# Patient Record
Sex: FEMALE | Race: WHITE | NOT HISPANIC OR LATINO | Employment: FULL TIME | URBAN - NONMETROPOLITAN AREA
[De-identification: names, ages, dates, MRNs, and addresses within clinical notes are randomized per-mention and may not be internally consistent; named-entity substitution may affect disease eponyms.]

---

## 2024-01-15 ENCOUNTER — APPOINTMENT (OUTPATIENT)
Dept: OCCUPATIONAL MEDICINE | Facility: OTHER | Age: 39
End: 2024-01-15

## 2024-01-15 PROCEDURE — 99000 SPECIMEN HANDLING OFFICE-LAB: CPT

## 2024-03-27 ENCOUNTER — APPOINTMENT (OUTPATIENT)
Dept: OCCUPATIONAL MEDICINE | Facility: OTHER | Age: 39
End: 2024-03-27

## 2024-03-27 ENCOUNTER — APPOINTMENT (OUTPATIENT)
Dept: GENERAL RADIOLOGY | Facility: HOSPITAL | Age: 39
End: 2024-03-27
Attending: PHYSICIAN ASSISTANT
Payer: OTHER MISCELLANEOUS

## 2024-03-27 ENCOUNTER — HOSPITAL ENCOUNTER (EMERGENCY)
Facility: HOSPITAL | Age: 39
Discharge: HOME | End: 2024-03-27
Attending: PHYSICIAN ASSISTANT | Admitting: PHYSICIAN ASSISTANT
Payer: OTHER MISCELLANEOUS

## 2024-03-27 VITALS
SYSTOLIC BLOOD PRESSURE: 142 MMHG | HEART RATE: 54 BPM | TEMPERATURE: 97.7 F | DIASTOLIC BLOOD PRESSURE: 87 MMHG | RESPIRATION RATE: 18 BRPM | OXYGEN SATURATION: 99 %

## 2024-03-27 DIAGNOSIS — S69.91XA FINGER INJURY, RIGHT, INITIAL ENCOUNTER: ICD-10-CM

## 2024-03-27 PROCEDURE — 12042 INTMD RPR N-HF/GENIT2.6-7.5: CPT

## 2024-03-27 PROCEDURE — 99283 EMERGENCY DEPT VISIT LOW MDM: CPT | Mod: 25 | Performed by: PHYSICIAN ASSISTANT

## 2024-03-27 PROCEDURE — 250N000009 HC RX 250: Performed by: PHYSICIAN ASSISTANT

## 2024-03-27 PROCEDURE — 99284 EMERGENCY DEPT VISIT MOD MDM: CPT | Mod: 25

## 2024-03-27 PROCEDURE — 82075 ASSAY OF BREATH ETHANOL: CPT

## 2024-03-27 PROCEDURE — 12042 INTMD RPR N-HF/GENIT2.6-7.5: CPT | Performed by: PHYSICIAN ASSISTANT

## 2024-03-27 PROCEDURE — 73140 X-RAY EXAM OF FINGER(S): CPT | Mod: RT

## 2024-03-27 PROCEDURE — 99000 SPECIMEN HANDLING OFFICE-LAB: CPT

## 2024-03-27 RX ORDER — CEPHALEXIN 500 MG/1
500 CAPSULE ORAL 3 TIMES DAILY
Qty: 15 CAPSULE | Refills: 0 | Status: SHIPPED | OUTPATIENT
Start: 2024-03-27 | End: 2024-04-01

## 2024-03-27 RX ORDER — LIDOCAINE HYDROCHLORIDE 20 MG/ML
40 INJECTION, SOLUTION INFILTRATION; PERINEURAL ONCE
Status: COMPLETED | OUTPATIENT
Start: 2024-03-27 | End: 2024-03-27

## 2024-03-27 RX ORDER — OXYCODONE AND ACETAMINOPHEN 5; 325 MG/1; MG/1
1 TABLET ORAL EVERY 6 HOURS PRN
Qty: 12 TABLET | Refills: 0 | Status: SHIPPED | OUTPATIENT
Start: 2024-03-27 | End: 2024-03-30

## 2024-03-27 RX ADMIN — LIDOCAINE HYDROCHLORIDE 40 MG: 20 INJECTION, SOLUTION INFILTRATION; PERINEURAL at 13:12

## 2024-03-27 ASSESSMENT — ACTIVITIES OF DAILY LIVING (ADL)
ADLS_ACUITY_SCORE: 35
ADLS_ACUITY_SCORE: 33

## 2024-03-27 ASSESSMENT — COLUMBIA-SUICIDE SEVERITY RATING SCALE - C-SSRS
2. HAVE YOU ACTUALLY HAD ANY THOUGHTS OF KILLING YOURSELF IN THE PAST MONTH?: NO
6. HAVE YOU EVER DONE ANYTHING, STARTED TO DO ANYTHING, OR PREPARED TO DO ANYTHING TO END YOUR LIFE?: NO
1. IN THE PAST MONTH, HAVE YOU WISHED YOU WERE DEAD OR WISHED YOU COULD GO TO SLEEP AND NOT WAKE UP?: NO

## 2024-03-27 NOTE — DISCHARGE INSTRUCTIONS
Please follow-up in the clinic early next week for wound check.    Suture removal is typically 10 to 14 days.    It is helpful to keep your finger straight for the next several days with an attempt to hopefully give the tissues time to heal.  This can be accomplished with simple over-the-counter finger splint.    The x-ray did not show any significant fracture to your distal finger which is surprising.  This could mean 1 of 2 things: Either you never did break your finger or I did an exceptional job at reattaching it.  Probably the first.   You still need a course of antibiotics.     Pain medications as needed.     Return here for any other concerns.

## 2024-03-27 NOTE — ED PROVIDER NOTES
History     Chief Complaint   Patient presents with    Hand Injury     The history is provided by the patient.     Teena Mariscal is a 38 year old female who presented to the emergency department ambulatory for evaluation of right index finger injury.  Injured it while at work when a press went through her finger.  No other concerns.  No blood thinners.    Allergies:  No Known Allergies    Problem List:    There are no problems to display for this patient.       Past Medical History:    No past medical history on file.    Past Surgical History:    No past surgical history on file.    Family History:    No family history on file.    Social History:  Marital Status:   [2]        Medications:    cephALEXin (KEFLEX) 500 MG capsule  oxyCODONE-acetaminophen (PERCOCET) 5-325 MG tablet          Review of Systems   Musculoskeletal:         See HPI       Physical Exam   BP: 154/98  Pulse: 69  Temp: 98.6  F (37  C)  Resp: 18  SpO2: 97 %      Physical Exam  Vitals and nursing note reviewed.   Constitutional:       General: She is not in acute distress.     Appearance: Normal appearance. She is not ill-appearing, toxic-appearing or diaphoretic.   Pulmonary:      Effort: Pulmonary effort is normal.   Musculoskeletal:      Comments: Examination right hand reveals a distal amputation involving the distal aspect of the right index finger involving approximately 25% of nail.  The patient does have the tissue airbag.   Neurological:      Mental Status: She is alert.         ED Course     ED Course as of 03/27/24 1353   Wed Mar 27, 2024   1351 My independent review of the finger x-ray shows no evidence of fracture.     [facility]    -Laceration Repair    Date/Time: 3/27/2024 1:30 PM    Performed by: Mary Kay Carr PA-C  Authorized by: Mary Kay Carr PA-C    Risks, benefits and alternatives discussed.      ANESTHESIA (see MAR for exact dosages):     Anesthesia method:  Nerve block    Block location:  Digital block  of the right index finger    Block needle gauge:  30 G    Block anesthetic:  Lidocaine 2% w/o epi    Block technique:  Digital    Block injection procedure:  Anatomic landmarks identified    Block outcome:  Anesthesia achieved    LACERATION DETAILS     Location:  Finger    Finger location:  R index finger    Length (cm):  3    REPAIR TYPE:     Repair type:  Intermediate    EXPLORATION:     Hemostasis achieved with:  Tourniquet    Wound exploration: wound explored through full range of motion      Wound extent: foreign bodies/material      Foreign bodies/material:  Metal and paint    TREATMENT:     Area cleansed with:  Soap and water    Amount of cleaning:  Standard    Irrigation solution:  Sterile saline    Irrigation volume:  50    Irrigation method:  Syringe    Visualized foreign bodies/material removed: yes      SKIN REPAIR     Repair method:  Sutures    Suture size:  4-0    Suture material:  Nylon    Suture technique:  Simple interrupted    Number of sutures:  9    APPROXIMATION     Approximation:  Close    POST-PROCEDURE DETAILS     Dressing:  Non-adherent dressing      PROCEDURE  Describe Procedure: With verbal consent I elected to anesthetize the finger using a digital block with 2% lidocaine obtaining an excellent anesthetic result.  This was after the digital spaces were cleaned with Hibiclens and alcohol.  Bleeding controlled with the use of finger tourniquet.  The distal tissues and distal quarter of the attached nail were cleaned extensively and I did elect to reattach the tissue to the amputated area.  Patient Tolerance:  Patient tolerated the procedure well with no immediate complications                Critical Care time:  none               Results for orders placed or performed during the hospital encounter of 03/27/24 (from the past 24 hour(s))   XR Finger Right G/E 2 Views    Narrative    PROCEDURE:  XR FINGER RIGHT G/E 2 VIEWS    HISTORY: Crushed in a press    COMPARISON:  None.    TECHNIQUE:  2  views of the second finger were obtained.    FINDINGS:  There is soft tissue injury of the terminal aspect of the  finger. No fractures are seen. The proximal and middle phalanges are  intact. The joints are normal in height.      Impression    IMPRESSION: Soft tissue tissue injury terminal phalanx of the second  finger.      TRUE ACEVES MD         SYSTEM ID:  [id number]       Medications   lidocaine injection 2% (40 mg Other $Given by Other Clinician 3/27/24 9153)       Assessments & Plan (with Medical Decision Making)   38-year-old female who presents with a finger injury.  Treatment as above.  See discharge instructions.  Keflex and pain medications as noted.  Strict return precautions provided.    The patient has also agreed to schedule a follow up appointment with her primary provider for re-evaluation and further management. She verbalized an understanding of the results of our workup and agrees with the complete discharge plan including instructions for return and follow up.  There is no indication for further investigation or treatment on an emergent basis.  There is no reasonably foreseeable injury that would be associated with discharge and close outpatient follow-up.      This document was prepared using a combination of typing and voice generated software.  While every attempt was made for accuracy, spelling and grammatical errors may exist.     I have reviewed the nursing notes.    I have reviewed the findings, diagnosis, plan and need for follow up with the patient.           Medical Decision Making  The patient's presentation was of moderate complexity (an acute complicated injury).    The patient's evaluation involved:  ordering and/or review of 1 test(s) in this encounter (x-ray)    The patient's management necessitated moderate risk (prescription drug management including medications given in the ED).        New Prescriptions    CEPHALEXIN (KEFLEX) 500 MG CAPSULE    Take 1 capsule (500 mg) by  mouth 3 times daily for 5 days    OXYCODONE-ACETAMINOPHEN (PERCOCET) 5-325 MG TABLET    Take 1 tablet by mouth every 6 hours as needed for pain       Final diagnoses:   Finger injury, right, initial encounter       3/27/2024   [facility]       Mary Kay Carr PA-C  03/27/24 0521

## 2024-03-27 NOTE — ED TRIAGE NOTES
PTA was using machine at work and drilled her right pointer finger, skin missing, bleeding controlled       Last tdap 7/13/2020

## 2024-04-01 ENCOUNTER — TELEPHONE (OUTPATIENT)
Dept: FAMILY MEDICINE | Facility: OTHER | Age: 39
End: 2024-04-01

## 2024-04-01 NOTE — TELEPHONE ENCOUNTER
I have openings on my schedule.  Schedule it.    If it's workers comp, it needs to be different visit than his est care anyway.

## 2024-04-01 NOTE — TELEPHONE ENCOUNTER
9:40 AM    Reason for Call: OVERBOOK    Patient is needing to be seen for a ER follow up for WC 03/27/2024 Tip of finger.    EST CARE set up for 04/16/2024. Needs to be seen sooner for finger.    The patient is requesting an appointment for ASAP with Dr. Mejia.    Was an appointment offered for this call? No  If yes : Appointment type              Date    Preferred method for responding to this message: Telephone Call  What is your phone number ?[phone]     If we cannot reach you directly, may we leave a detailed response at the number you provided? Yes    Can this message wait until your PCP/provider returns, if unavailable today? Not applicable    Michelle Warner

## 2024-04-01 NOTE — TELEPHONE ENCOUNTER
New Patient Record Request:    Patient: Teena Mariscal   MRN: [id number]     Previous Place(s) Receiving Care: [facility] [place], [place]    Previous PCP: Unknown    *route via inbasket to Marisela Pandey

## 2024-04-01 NOTE — TELEPHONE ENCOUNTER
Patient needs an appointment for ED follow up for work comp. Patient has an establish care appointment on 4/16. Please advise.  Munir Casanova LPN

## 2024-04-02 NOTE — TELEPHONE ENCOUNTER
Attempt # 1  Outcome: Left Message   Comment: NORAHM to schedule ER Follow Up with Roberto GOMEZ

## 2024-04-15 PROBLEM — F41.9 ANXIETY: Status: ACTIVE | Noted: 2024-04-15

## 2024-04-15 PROBLEM — N91.0 PRIMARY AMENORRHEA: Status: ACTIVE | Noted: 2019-05-24

## 2024-04-15 PROBLEM — Z64.1 MULTIGRAVIDA: Status: ACTIVE | Noted: 2024-04-15

## 2024-04-15 PROBLEM — G43.109 MIGRAINE WITH AURA AND WITHOUT STATUS MIGRAINOSUS, NOT INTRACTABLE: Status: ACTIVE | Noted: 2020-07-30

## 2024-04-15 PROBLEM — Z86.14 PERSONAL HISTORY OF METHICILLIN RESISTANT STAPHYLOCOCCUS AUREUS: Status: ACTIVE | Noted: 2024-04-15

## 2024-04-15 PROBLEM — R87.619 ABNORMAL PAP SMEAR OF CERVIX: Status: ACTIVE | Noted: 2024-04-15

## 2024-04-15 PROBLEM — F41.9 ANXIETY DISORDER, UNSPECIFIED TYPE: Status: ACTIVE | Noted: 2020-07-30

## 2024-04-15 RX ORDER — CYCLOBENZAPRINE HCL 5 MG
5 TABLET ORAL
COMMUNITY
Start: 2023-04-24

## 2024-04-15 RX ORDER — NAPROXEN 500 MG/1
500 TABLET ORAL
COMMUNITY
Start: 2023-04-24

## 2024-04-15 RX ORDER — SULFAMETHOXAZOLE/TRIMETHOPRIM 800-160 MG
1 TABLET ORAL
COMMUNITY
Start: 2024-04-09 | End: 2024-04-16

## 2024-04-15 RX ORDER — ACETAMINOPHEN 500 MG
500-1000 TABLET ORAL
COMMUNITY
Start: 2023-04-20

## 2024-04-15 NOTE — PROGRESS NOTES
Occupational Visit     SUBJECTIVE:  Teena Mariscal, 38 year old, female is seen for new evaluation and treatment of occupational injury. Date of injury is 3/27/24.    No linked episodes    Musculoskeletal problem/pain        Work related injury: Yes  Employer at time of injury: [employer]  Employed elsewhere? No  Date of injury: March 27, 2024    38 year old Right handed female.    Patient had a distal right 2nd digit avulsion/amputation on 3/27/24.  Avulsed skin sutured in place in the ER.  Sutures out at [facility].  Here because still discolored, numb/tingling.  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        No Known Allergies      Review of Systems:  No fever or chills.      OBJECTIVE:  Vitals:    04/16/24 1528   BP: 110/74   Pulse: 84   Resp: 18   Temp: 98.2  F (36.8  C)   SpO2: 99%               Exam:    Essentially non-viable appearing skin on tip of finger.  No cellulitis.  No warmth/purulence.  Not overly tender.  See images below.           Labs: No results found for this or any previous visit (from the past 24 hour(s)).      ASSESSMENT/PLAN:  .Teena was seen today for work comp.    Diagnoses and all orders for this visit:    Injury of finger of right hand, subsequent encounter  -     Orthopedic  Referral; Future     Appears non-viable.  Will have Ortho/Hand see her for further eval/mgmt.  Suspect plan will be supportive/wound care as long as doesn't become infected.  Discussed signs of infection that would require immediate re-eval.

## 2024-04-16 ENCOUNTER — OFFICE VISIT (OUTPATIENT)
Dept: FAMILY MEDICINE | Facility: OTHER | Age: 39
End: 2024-04-16
Attending: FAMILY MEDICINE
Payer: OTHER MISCELLANEOUS

## 2024-04-16 VITALS
BODY MASS INDEX: 37.19 KG/M2 | HEART RATE: 84 BPM | RESPIRATION RATE: 18 BRPM | TEMPERATURE: 98.2 F | WEIGHT: 245.4 LBS | DIASTOLIC BLOOD PRESSURE: 74 MMHG | HEIGHT: 68 IN | OXYGEN SATURATION: 99 % | SYSTOLIC BLOOD PRESSURE: 110 MMHG

## 2024-04-16 DIAGNOSIS — S69.91XD INJURY OF FINGER OF RIGHT HAND, SUBSEQUENT ENCOUNTER: Primary | ICD-10-CM

## 2024-04-16 PROCEDURE — 99213 OFFICE O/P EST LOW 20 MIN: CPT | Performed by: FAMILY MEDICINE

## 2024-04-16 NOTE — COMMUNITY RESOURCES LIST (ENGLISH)
April 16, 2024           YOUR PERSONALIZED LIST OF SERVICES & PROGRAMS           NAVIGATION    Eligibility Screening      [facility] - Temporary cash assistance for families  [address] [place] [address] (Distance: 4.1 miles)  Language: English  Fee: Free  Accessibility: Translation services      [facility] - SNAP application assistance  [address] [place] [address] (Distance: 4.1 miles)  Language: English  Fee: Free  Accessibility: Translation services      [employer]  Phone: (615) 319-3809  Website: https://www.[facility] [place].org/about-us/assister-program/navigators/index.jsp  Language: English  Hours: Mon 8:00 AM - 4:00 PM Tue 8:00 AM - 4:00 PM Wed 8:00 AM - 4:00 PM Thu 8:00 AM - 4:00 PM        ASSISTANCE    Nutrition Benefits      [facility] - SNAP application assistance  [address] [place] [address] (Distance: 4.1 miles)  Language: English  Fee: Free  Accessibility: Translation services      [facility] application assistance  [address] [place] [address] (Distance: 4.1 miles)  Language: English  Fee: Free      [employer] [place] [facility] labs  Phone: (513) 294-8616  Website: https://www.[employer].org/programs/market-LapSpace/  Language: English  Hours: Mon 10:00 AM - 5:00 PM Tue 10:00 AM - 5:00 PM Wed 10:00 AM - 5:00 PM Thu 10:00 AM - 5:00 PM Fri 10:00 AM - 5:00 PM  Fee: Self pay    Pantry      [employer] [facility] Free Pantry  [address] [place] [address] (Distance: 16.6 miles)  Language: English  Fee: Free      [facility] - Food pantry - [facility]  [address] [place] [address] (Distance: 4.6 miles)  Language: English  Fee: Free, Self pay      [employer]  Phone: (677) 356-2730  Website: https://www.[employer].org/empowerment-food-bank  Language: English  Hours: Mon 9:00 AM - 5:00 PM Tue 9:00 AM - 5:00 PM Wed 9:00 AM - 5:00 PM Thu 9:00 AM -  5:00 PM Fri 9:00 AM - 5:00 PM  Fee: Free            Bill Payment Assistance      - Dislocated Worker/Adult [employer] Employment Program  Phone: (712) 976-7918  Email: destinee@[employer]  Website: https://[employer]/services/employment-services/dislocated-worker-program/  Language: English, Monegasque  Hours: Mon 8:00 AM - 4:30 PM Tue 8:00 AM - 4:30 PM Wed 8:00 AM - 4:30 PM Thu 8:00 AM - 4:30 PM Fri 8:00 AM - 4:30 PM  Fee: Free  Accessibility: Ada accessible               IMPORTANT NUMBERS & WEBSITES        Emergency Services  911  .   [facility]  211 http://211unitedway.org  .   Poison Control  (766) 257-2605 http://[place]poison.org http://[place]poison.org  .     Suicide and Crisis Lifeline  988 http://988Tyromerline.Captricity  .   [employer]help [place] Child Abuse Hotline  214.185.7543 http://Childhelphotline.org   .   [place] Sexual Assault Hotline  (857) 156-1522 (HOPE) http://Admazely.Captricity   .     National Runaway Safeline  (296) 511-1799 (RUNAWAY) http://HandInScanruSavvyCard.org  .   Pregnancy & Postpartum Support  Call/text 502-132-1318  [place]: http://ppsupport[place].org  [place]: http://Badger Maps.com/wi  .   Substance Abuse National Helpline ([facility])  195-067-HELP (1319) http://Findtreatment.gov   .                DISCLAIMER: These resources have been generated via the [employer] Platform. [employer] does not endorse any service providers mentioned in this resource list. [employer] does not guarantee that the services mentioned in this resource list will be available to you or will improve your health or wellness.    [facility]

## 2024-04-17 ENCOUNTER — TELEPHONE (OUTPATIENT)
Dept: CARE COORDINATION | Facility: OTHER | Age: 39
End: 2024-04-17

## 2024-04-17 NOTE — TELEPHONE ENCOUNTER
Patient called and stated she had her finger cut off at work and it was reattached 3/27/2024 and states no paperwork has been submitted to work comp. RN gave her the phone number for medical records and patient financial and informed her if she needed any other assistance to call back and gave her direct contact number.

## 2024-05-20 ENCOUNTER — APPOINTMENT (OUTPATIENT)
Dept: CT IMAGING | Facility: HOSPITAL | Age: 39
End: 2024-05-20
Attending: PHYSICIAN ASSISTANT
Payer: COMMERCIAL

## 2024-05-20 ENCOUNTER — HOSPITAL ENCOUNTER (EMERGENCY)
Facility: HOSPITAL | Age: 39
Discharge: HOME | End: 2024-05-20
Attending: PHYSICIAN ASSISTANT | Admitting: PHYSICIAN ASSISTANT
Payer: COMMERCIAL

## 2024-05-20 VITALS
DIASTOLIC BLOOD PRESSURE: 86 MMHG | OXYGEN SATURATION: 100 % | TEMPERATURE: 98.3 F | RESPIRATION RATE: 18 BRPM | HEART RATE: 47 BPM | SYSTOLIC BLOOD PRESSURE: 136 MMHG

## 2024-05-20 DIAGNOSIS — H92.02 POSTERIOR AURICULAR PAIN OF LEFT EAR: ICD-10-CM

## 2024-05-20 DIAGNOSIS — L53.9 SKIN ERYTHEMA: ICD-10-CM

## 2024-05-20 PROCEDURE — 99284 EMERGENCY DEPT VISIT MOD MDM: CPT | Performed by: PHYSICIAN ASSISTANT

## 2024-05-20 PROCEDURE — 99284 EMERGENCY DEPT VISIT MOD MDM: CPT | Mod: 25

## 2024-05-20 PROCEDURE — 70450 CT HEAD/BRAIN W/O DYE: CPT

## 2024-05-20 RX ORDER — HYDROCODONE BITARTRATE AND ACETAMINOPHEN 5; 325 MG/1; MG/1
1 TABLET ORAL EVERY 6 HOURS PRN
Qty: 8 TABLET | Refills: 0 | Status: SHIPPED | OUTPATIENT
Start: 2024-05-20 | End: 2024-05-23

## 2024-05-20 ASSESSMENT — COLUMBIA-SUICIDE SEVERITY RATING SCALE - C-SSRS
2. HAVE YOU ACTUALLY HAD ANY THOUGHTS OF KILLING YOURSELF IN THE PAST MONTH?: NO
1. IN THE PAST MONTH, HAVE YOU WISHED YOU WERE DEAD OR WISHED YOU COULD GO TO SLEEP AND NOT WAKE UP?: NO
6. HAVE YOU EVER DONE ANYTHING, STARTED TO DO ANYTHING, OR PREPARED TO DO ANYTHING TO END YOUR LIFE?: NO

## 2024-05-20 ASSESSMENT — ACTIVITIES OF DAILY LIVING (ADL): ADLS_ACUITY_SCORE: 35

## 2024-05-20 NOTE — ED NOTES
Patient presents w/ c/o pain behind her left ear since 05/17/24 when she woke up.   Denies fever, chills. Skin is reddened behind left ear, pain radiates down her neck.

## 2024-05-20 NOTE — DISCHARGE INSTRUCTIONS
Please start antibiotics as prescribed.    Pain medications as needed.    Return here for any other questions or concerns    Please follow-up in clinic for persistent symptoms

## 2024-05-20 NOTE — ED PROVIDER NOTES
History     Chief Complaint   Patient presents with    Wound Check     The history is provided by the patient.     Teena Mariscal is a 38 year old female who presented to the emergency department ambulatory for evaluation of left ear pain symptoms have been ongoing for approximate last 3 days.  No fevers or chills.  No history immunosuppression.  No injuries.  She has reported swelling and erythema behind the ear.  Radiation down the left side of the neck.  No difficulty swallowing.    Allergies:  No Known Allergies    Problem List:    Patient Active Problem List    Diagnosis Date Noted    Abnormal Pap smear of cervix 04/15/2024     Priority: Medium     Formatting of this note might be different from the original.   ASCUS positive HPV   ; Abnormal Pap smear      Anxiety 04/15/2024     Priority: Medium     Formatting of this note might be different from the original.   Jan 2013 - saw therapist.  Feeling better      Multigravida 04/15/2024     Priority: Medium    Personal history of methicillin resistant Staphylococcus aureus 04/15/2024     Priority: Medium     Formatting of this note might be different from the original.   2007 wound on leg      Anxiety disorder, unspecified type 07/30/2020     Priority: Medium    Migraine with aura and without status migrainosus, not intractable 07/30/2020     Priority: Medium    Primary amenorrhea 05/24/2019     Priority: Medium    Anemia 08/09/2016     Priority: Medium    Depressive disorder 07/01/2011     Priority: Medium     Formatting of this note might be different from the original.      ; Depression  NOS      High-risk pregnancy 07/01/2011     Priority: Medium     Formatting of this note might be different from the original.   housing, hx +Utox, hx c/s x 2   ; Preg High Risk  NOS      Normal delivery 09/22/2006     Priority: Medium        Past Medical History:    History reviewed. No pertinent past medical history.    Past Surgical History:    History reviewed. No pertinent  surgical history.    Family History:    History reviewed. No pertinent family history.    Social History:  Marital Status:   [2]  Social History     Tobacco Use    Smoking status: Never     Passive exposure: Never   Vaping Use    Vaping status: Every Day    Substances: Nicotine    Devices: RefOneTouchEMRble tank   Substance Use Topics    Alcohol use: Not Currently    Drug use: Yes     Types: Marijuana     Comment: occasionally        Medications:    amoxicillin-clavulanate (AUGMENTIN) 875-125 MG tablet  HYDROcodone-acetaminophen (NORCO) 5-325 MG tablet  Acetaminophen (TYLENOL) 325 MG CAPS  acetaminophen (TYLENOL) 500 MG tablet  cyclobenzaprine (FLEXERIL) 5 MG tablet  naproxen (NAPROSYN) 500 MG tablet  Prenatal Multivit-Min-Fe-FA (PRENATAL ESSENTIALS PO)          Review of Systems   HENT:          See HPI   Allergic/Immunologic: Negative for immunocompromised state.       Physical Exam   BP: 151/90  Pulse: 59  Temp: 98.3  F (36.8  C)  Resp: 18  SpO2: 98 %      Physical Exam  Vitals and nursing note reviewed.   Constitutional:       General: She is not in acute distress.     Appearance: Normal appearance. She is not ill-appearing, toxic-appearing or diaphoretic.   HENT:      Left Ear: Ear canal normal.      Ears:      Comments: There is some mild erythema with tenderness upon palpation of the posterior regular area including the mastoid.  There is no significant masses appreciated.  There is no palpable abscess.  Canal is unremarkable.  Normal speech.  Cardiovascular:      Rate and Rhythm: Normal rate and regular rhythm.   Pulmonary:      Effort: Pulmonary effort is normal.   Skin:     General: Skin is warm and dry.      Capillary Refill: Capillary refill takes less than 2 seconds.   Neurological:      General: No focal deficit present.      Mental Status: She is alert and oriented to person, place, and time.         ED Course        Procedures              Critical Care time:  none               Results for orders  placed or performed during the hospital encounter of 05/20/24 (from the past 24 hour(s))   CT Head w/o Contrast    Narrative    EXAM: CT HEAD W/O CONTRAST, 5/20/2024 2:16 PM    HISTORY: Erythema, swelling, tenderness to the left mastoid area.    COMPARISON: None    TECHNIQUE:   Imaging protocol: Multiplanar CT images of the head without  intravenous contrast.   Acquisition: This CT exam was performed using one or more the  following dose reduction techniques: automated exposure control,  adjustment of the mA and/or kV according to patient size, and/or  iterative reconstruction technique.    FINDINGS:  No intracranial hemorrhage or midline shift. The ventricles are  proportionate to the cerebral sulci. No acute loss of gray-white  matter differentiation. Benign left parasagittal posterior fossa  arachnoid cyst.    No acute osseous abnormality. Moderate bilateral polypoid mucosal  thickening in the maxillary sinuses. Mastoid air cells are clear. The  orbits are grossly unremarkable.       Impression    IMPRESSION:  1.  No acute intracranial abnormality.  2.  Right mastoid air cells and right mastoid region appear within  normal limits.      KAYLAH LEES MD         SYSTEM ID:  [id number]       Medications - No data to display    Assessments & Plan (with Medical Decision Making)   38-year-old female with left-sided postauricular swelling and tenderness.  No evidence of mastoiditis on imaging.  However, given the patient's symptoms as well as objective and subjective findings we will plan to treat with Augmentin as well as pain medications.  Strict return precautions provided.  Close clinic follow-up discussed.  There is no reasonable indication for laboratory evaluation at this time as it would not change my treatment plan.  Her vital signs are normal.  She has no tachycardia or fever.    Ms. Mariscal has also agreed to schedule a follow up appointment with her primary clinic for re-evaluation and further management. She  verbalized an understanding of the results of our workup and agrees with the complete discharge plan including instructions for return and follow up.  There is no indication for further investigation or treatment on an emergent basis.  There is no reasonably foreseeable injury that would be associated with discharge and close outpatient follow-up.      This document was prepared using a combination of typing and voice generated software.  While every attempt was made for accuracy, spelling and grammatical errors may exist.     I have reviewed the nursing notes.    I have reviewed the findings, diagnosis, plan and need for follow up with the patient.           Medical Decision Making  The patient's presentation was of moderate complexity (an undiagnosed new problem with uncertain diagnosis).    The patient's evaluation involved:  strong consideration of a test (lab work) that was ultimately deferred  ordering and/or review of 1 test(s) in this encounter (CT scan)    The patient's management necessitated moderate risk (prescription drug management including medications given in the ED).        New Prescriptions    AMOXICILLIN-CLAVULANATE (AUGMENTIN) 875-125 MG TABLET    Take 1 tablet by mouth 2 times daily for 7 days    HYDROCODONE-ACETAMINOPHEN (NORCO) 5-325 MG TABLET    Take 1 tablet by mouth every 6 hours as needed       Final diagnoses:   Skin erythema   Posterior auricular pain of left ear       5/20/2024   [facility]       Mary Kay Carr PA-C  05/20/24 6183

## 2024-05-20 NOTE — ED TRIAGE NOTES
"Patient presents with c/o \"lump\" behind left ear that pain is now radiating into left side of neck, patient noted \"lump\" Friday night. Denies any fevers or chills. Pinpoint laceration/abrasion noted behind left ear, area appears swollen and red.         "